# Patient Record
Sex: MALE | Race: WHITE | NOT HISPANIC OR LATINO | Employment: FULL TIME | URBAN - METROPOLITAN AREA
[De-identification: names, ages, dates, MRNs, and addresses within clinical notes are randomized per-mention and may not be internally consistent; named-entity substitution may affect disease eponyms.]

---

## 2017-11-02 ENCOUNTER — HOSPITAL ENCOUNTER (EMERGENCY)
Facility: HOSPITAL | Age: 47
Discharge: HOME | End: 2017-11-02
Attending: EMERGENCY MEDICINE | Admitting: EMERGENCY MEDICINE
Payer: COMMERCIAL

## 2017-11-02 VITALS
HEART RATE: 97 BPM | SYSTOLIC BLOOD PRESSURE: 142 MMHG | DIASTOLIC BLOOD PRESSURE: 94 MMHG | WEIGHT: 203 LBS | RESPIRATION RATE: 16 BRPM | OXYGEN SATURATION: 98 % | TEMPERATURE: 98 F

## 2017-11-02 DIAGNOSIS — S09.90XA CLOSED HEAD INJURY, INITIAL ENCOUNTER: ICD-10-CM
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
## 2017-11-02 PROCEDURE — 99283 EMERGENCY DEPT VISIT LOW MDM: CPT
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
## 2024-11-07 ENCOUNTER — APPOINTMENT (EMERGENCY)
Dept: CT IMAGING | Facility: HOSPITAL | Age: 54
End: 2024-11-07
Payer: COMMERCIAL

## 2024-11-07 ENCOUNTER — HOSPITAL ENCOUNTER (EMERGENCY)
Facility: HOSPITAL | Age: 54
Discharge: HOME | End: 2024-11-07
Attending: EMERGENCY MEDICINE
Payer: COMMERCIAL

## 2024-11-07 VITALS
HEART RATE: 68 BPM | TEMPERATURE: 97.3 F | OXYGEN SATURATION: 98 % | RESPIRATION RATE: 16 BRPM | SYSTOLIC BLOOD PRESSURE: 166 MMHG | DIASTOLIC BLOOD PRESSURE: 88 MMHG

## 2024-11-07 DIAGNOSIS — M54.50 LOW BACK PAIN: Primary | ICD-10-CM

## 2024-11-07 LAB
ALBUMIN SERPL BCG-MCNC: 4.1 G/DL (ref 3.5–5)
ALP SERPL-CCNC: 56 U/L (ref 34–104)
ALT SERPL W P-5'-P-CCNC: 13 U/L (ref 7–52)
ANION GAP SERPL CALCULATED.3IONS-SCNC: 4 MMOL/L (ref 4–13)
AST SERPL W P-5'-P-CCNC: 23 U/L (ref 13–39)
BASOPHILS # BLD AUTO: 0.03 THOUSANDS/ÂΜL (ref 0–0.1)
BASOPHILS NFR BLD AUTO: 0 % (ref 0–1)
BILIRUB SERPL-MCNC: 0.53 MG/DL (ref 0.2–1)
BILIRUB UR QL STRIP: NEGATIVE
BUN SERPL-MCNC: 12 MG/DL (ref 5–25)
CALCIUM SERPL-MCNC: 8.7 MG/DL (ref 8.4–10.2)
CHLORIDE SERPL-SCNC: 104 MMOL/L (ref 96–108)
CLARITY UR: ABNORMAL
CO2 SERPL-SCNC: 27 MMOL/L (ref 21–32)
COLOR UR: ABNORMAL
CREAT SERPL-MCNC: 0.88 MG/DL (ref 0.6–1.3)
EOSINOPHIL # BLD AUTO: 0.02 THOUSAND/ÂΜL (ref 0–0.61)
EOSINOPHIL NFR BLD AUTO: 0 % (ref 0–6)
ERYTHROCYTE [DISTWIDTH] IN BLOOD BY AUTOMATED COUNT: 12.9 % (ref 11.6–15.1)
GFR SERPL CREATININE-BSD FRML MDRD: 96 ML/MIN/1.73SQ M
GLUCOSE SERPL-MCNC: 100 MG/DL (ref 65–140)
GLUCOSE UR STRIP-MCNC: NEGATIVE MG/DL
HCT VFR BLD AUTO: 43.6 % (ref 36.5–49.3)
HGB BLD-MCNC: 14.6 G/DL (ref 12–17)
HGB UR QL STRIP.AUTO: NEGATIVE
IMM GRANULOCYTES # BLD AUTO: 0.04 THOUSAND/UL (ref 0–0.2)
IMM GRANULOCYTES NFR BLD AUTO: 0 % (ref 0–2)
KETONES UR STRIP-MCNC: ABNORMAL MG/DL
LEUKOCYTE ESTERASE UR QL STRIP: NEGATIVE
LYMPHOCYTES # BLD AUTO: 0.85 THOUSANDS/ÂΜL (ref 0.6–4.47)
LYMPHOCYTES NFR BLD AUTO: 8 % (ref 14–44)
MCH RBC QN AUTO: 30.7 PG (ref 26.8–34.3)
MCHC RBC AUTO-ENTMCNC: 33.5 G/DL (ref 31.4–37.4)
MCV RBC AUTO: 92 FL (ref 82–98)
MONOCYTES # BLD AUTO: 0.61 THOUSAND/ÂΜL (ref 0.17–1.22)
MONOCYTES NFR BLD AUTO: 6 % (ref 4–12)
NEUTROPHILS # BLD AUTO: 9.04 THOUSANDS/ÂΜL (ref 1.85–7.62)
NEUTS SEG NFR BLD AUTO: 86 % (ref 43–75)
NITRITE UR QL STRIP: NEGATIVE
NRBC BLD AUTO-RTO: 0 /100 WBCS
PH UR STRIP.AUTO: 7.5 [PH]
PLATELET # BLD AUTO: 265 THOUSANDS/UL (ref 149–390)
PMV BLD AUTO: 10.5 FL (ref 8.9–12.7)
POTASSIUM SERPL-SCNC: 5.7 MMOL/L (ref 3.5–5.3)
PROT SERPL-MCNC: 6.7 G/DL (ref 6.4–8.4)
PROT UR STRIP-MCNC: NEGATIVE MG/DL
RBC # BLD AUTO: 4.76 MILLION/UL (ref 3.88–5.62)
SODIUM SERPL-SCNC: 135 MMOL/L (ref 135–147)
SP GR UR STRIP.AUTO: 1.02 (ref 1–1.03)
UROBILINOGEN UR STRIP-ACNC: <2 MG/DL
WBC # BLD AUTO: 10.59 THOUSAND/UL (ref 4.31–10.16)

## 2024-11-07 PROCEDURE — 74177 CT ABD & PELVIS W/CONTRAST: CPT

## 2024-11-07 PROCEDURE — 81003 URINALYSIS AUTO W/O SCOPE: CPT | Performed by: PHYSICIAN ASSISTANT

## 2024-11-07 PROCEDURE — 36415 COLL VENOUS BLD VENIPUNCTURE: CPT | Performed by: PHYSICIAN ASSISTANT

## 2024-11-07 PROCEDURE — 85025 COMPLETE CBC W/AUTO DIFF WBC: CPT | Performed by: PHYSICIAN ASSISTANT

## 2024-11-07 PROCEDURE — 96374 THER/PROPH/DIAG INJ IV PUSH: CPT

## 2024-11-07 PROCEDURE — 99285 EMERGENCY DEPT VISIT HI MDM: CPT | Performed by: PHYSICIAN ASSISTANT

## 2024-11-07 PROCEDURE — 96375 TX/PRO/DX INJ NEW DRUG ADDON: CPT

## 2024-11-07 PROCEDURE — 80053 COMPREHEN METABOLIC PANEL: CPT | Performed by: PHYSICIAN ASSISTANT

## 2024-11-07 PROCEDURE — 99284 EMERGENCY DEPT VISIT MOD MDM: CPT

## 2024-11-07 PROCEDURE — 96376 TX/PRO/DX INJ SAME DRUG ADON: CPT

## 2024-11-07 RX ORDER — OXYCODONE AND ACETAMINOPHEN 5; 325 MG/1; MG/1
1 TABLET ORAL EVERY 6 HOURS PRN
Qty: 12 TABLET | Refills: 0 | Status: SHIPPED | OUTPATIENT
Start: 2024-11-07 | End: 2024-11-10

## 2024-11-07 RX ORDER — CYCLOBENZAPRINE HCL 5 MG
5 TABLET ORAL 3 TIMES DAILY PRN
Qty: 9 TABLET | Refills: 0 | Status: SHIPPED | OUTPATIENT
Start: 2024-11-07 | End: 2024-11-10

## 2024-11-07 RX ORDER — ONDANSETRON 2 MG/ML
4 INJECTION INTRAMUSCULAR; INTRAVENOUS ONCE
Status: COMPLETED | OUTPATIENT
Start: 2024-11-07 | End: 2024-11-07

## 2024-11-07 RX ORDER — NAPROXEN 500 MG/1
500 TABLET ORAL 2 TIMES DAILY WITH MEALS
Qty: 14 TABLET | Refills: 0 | Status: SHIPPED | OUTPATIENT
Start: 2024-11-07 | End: 2024-11-14

## 2024-11-07 RX ORDER — HYDROMORPHONE HCL/PF 1 MG/ML
0.5 SYRINGE (ML) INJECTION ONCE
Status: COMPLETED | OUTPATIENT
Start: 2024-11-07 | End: 2024-11-07

## 2024-11-07 RX ADMIN — HYDROMORPHONE HYDROCHLORIDE 0.5 MG: 1 INJECTION, SOLUTION INTRAMUSCULAR; INTRAVENOUS; SUBCUTANEOUS at 20:34

## 2024-11-07 RX ADMIN — IOHEXOL 100 ML: 350 INJECTION, SOLUTION INTRAVENOUS at 19:29

## 2024-11-07 RX ADMIN — HYDROMORPHONE HYDROCHLORIDE 0.5 MG: 1 INJECTION, SOLUTION INTRAMUSCULAR; INTRAVENOUS; SUBCUTANEOUS at 17:55

## 2024-11-07 RX ADMIN — ONDANSETRON 4 MG: 2 INJECTION INTRAMUSCULAR; INTRAVENOUS at 17:56

## 2024-11-07 NOTE — ED PROVIDER NOTES
Time reflects when diagnosis was documented in both MDM as applicable and the Disposition within this note       Time User Action Codes Description Comment    11/7/2024  8:24 PM Dennise Orellana Add [M54.50] Low back pain           ED Disposition       ED Disposition   Discharge    Condition   Stable    Date/Time   Thu Nov 7, 2024  8:24 PM    Comment   Mikie Garcia discharge to home/self care.                   Assessment & Plan       Medical Decision Making  Differential diagnosis includes but is not limited to: Kidney stone, appendicitis, musculoskeletal, UTI, will check PSA as patient has concern for hereditary prostate cancer because his father has it.  He states he has never been checked.    Amount and/or Complexity of Data Reviewed  Independent Historian: spouse  Labs: ordered. Decision-making details documented in ED Course.  Radiology: ordered.    Risk  Prescription drug management.        ED Course as of 11/07/24 2031   Thu Nov 07, 2024   1837 Potassium(!): 5.7  Moderately hemolyzed   1945 Reviewed lab results with patient, his pain is improved, awaiting CT results       Medications   HYDROmorphone (DILAUDID) injection 0.5 mg (has no administration in time range)   ondansetron (ZOFRAN) injection 4 mg (4 mg Intravenous Given 11/7/24 1756)   HYDROmorphone (DILAUDID) injection 0.5 mg (0.5 mg Intravenous Given 11/7/24 1755)   iohexol (OMNIPAQUE) 350 MG/ML injection (MULTI-DOSE) 100 mL (100 mL Intravenous Given 11/7/24 1929)       ED Risk Strat Scores                           SBIRT 20yo+      Flowsheet Row Most Recent Value   Initial Alcohol Screen: US AUDIT-C     1. How often do you have a drink containing alcohol? 0 Filed at: 11/07/2024 1632   2. How many drinks containing alcohol do you have on a typical day you are drinking?  0 Filed at: 11/07/2024 1632   3a. Male UNDER 65: How often do you have five or more drinks on one occasion? 0 Filed at: 11/07/2024 1632   3b. FEMALE Any Age, or MALE 65+: How often do  "you have 4 or more drinks on one occassion? 0 Filed at: 11/07/2024 1632   Audit-C Score 0 Filed at: 11/07/2024 1632   NOHEMY: How many times in the past year have you...    Used an illegal drug or used a prescription medication for non-medical reasons? Never Filed at: 11/07/2024 1632                            History of Present Illness       Chief Complaint   Patient presents with    Back Pain     Hx of back pain. Pointing at right sciatic region. Noted intense \"bone on bone feeling\" in right groin/hip area. Reports difficulty ambulating, sitting.        History reviewed. No pertinent past medical history.   History reviewed. No pertinent surgical history.   History reviewed. No pertinent family history.   Social History     Tobacco Use    Smoking status: Never    Smokeless tobacco: Never   Substance Use Topics    Alcohol use: Yes     Comment: daily    Drug use: No      E-Cigarette/Vaping      E-Cigarette/Vaping Substances      I have reviewed and agree with the history as documented.     54-year-old male presents to emergency room for evaluation of right lower back pain.  Patient states he has a history of this however today he came on much different than normal.  He radiates into his groin and he had significant difficulty bending down and lifting his leg up to put his shoe on.  Patient states he was so uncomfortable he almost had to stop the car on the way home from work.  Pain also gives a dull ache into his testicle.  He denies any urinary symptoms.  He states this has happened before with his back pain. Denies fever nausea or vomiting.  Denies chest pain or shortness of breath.  Denies bowel or bladder dysfunction.  Denies saddle anesthesia.  He has tried cupping.  He sees a physiologist and does lots of exercises to of strength training.  He took ibuprofen, Flexeril, Percocet in order to be able to get himself to the ER today.  He is able to walk with a lot of pain.  Denies traumatic injury however does state " he is very active with golfing.      History provided by:  Patient  Back Pain  Associated symptoms: no chest pain, no fever, no numbness and no weakness        Review of Systems   Constitutional:  Negative for fever.   Respiratory:  Negative for shortness of breath.    Cardiovascular:  Negative for chest pain.   Gastrointestinal:  Negative for vomiting.   Genitourinary:  Negative for difficulty urinating.   Musculoskeletal:  Positive for back pain.   Skin:  Negative for rash.   Neurological:  Negative for weakness and numbness.           Objective       ED Triage Vitals   Temperature Pulse Blood Pressure Respirations SpO2 Patient Position - Orthostatic VS   11/07/24 1634 11/07/24 1630 11/07/24 1630 11/07/24 1630 11/07/24 1630 --   (!) 97.3 °F (36.3 °C) 60 (!) 172/96 18 98 %       Temp Source Heart Rate Source BP Location FiO2 (%) Pain Score    11/07/24 1630 11/07/24 1630 11/07/24 1630 -- --    Oral Monitor Right arm        Vitals      Date and Time Temp Pulse SpO2 Resp BP Pain Score FACES Pain Rating User   11/07/24 1634 97.3 °F (36.3 °C) -- -- -- -- -- -- LR   11/07/24 1630 -- 60 98 % 18 172/96 Standing -- -- NS            Physical Exam  Vitals and nursing note reviewed.   Constitutional:       Appearance: Normal appearance. He is well-developed.   HENT:      Head: Atraumatic.      Right Ear: External ear normal.      Left Ear: External ear normal.   Eyes:      General: No scleral icterus.     Conjunctiva/sclera: Conjunctivae normal.   Cardiovascular:      Rate and Rhythm: Normal rate and regular rhythm.      Heart sounds: Normal heart sounds.   Pulmonary:      Effort: Pulmonary effort is normal.      Breath sounds: Normal breath sounds.   Abdominal:      General: Bowel sounds are normal. There is no distension.      Palpations: Abdomen is soft.      Tenderness: There is abdominal tenderness. There is rebound. There is no right CVA tenderness, left CVA tenderness or guarding. Negative signs include Claire's sign  and obturator sign.       Musculoskeletal:         General: Normal range of motion.      Cervical back: Neck supple.      Thoracic back: Normal.      Lumbar back: Tenderness present. No bony tenderness. Negative right straight leg raise test and negative left straight leg raise test.      Comments: 5/5 Strength BLE flexion and extension  No foot drop.  Able to walk     Skin:     General: Skin is warm and dry.      Findings: No rash.      Comments: Signs of cupping noted along right lower back and right upper leg   Neurological:      Mental Status: He is alert and oriented to person, place, and time.      Deep Tendon Reflexes:      Reflex Scores:       Patellar reflexes are 2+ on the right side and 2+ on the left side.  Psychiatric:         Mood and Affect: Mood normal.         Results Reviewed       Procedure Component Value Units Date/Time    UA w Reflex to Microscopic w Reflex to Culture [[id number]]  (Abnormal) Collected: 11/07/24 1847    Lab Status: Final result Specimen: Urine, Clean Catch Updated: 11/07/24 1858     Color, UA Light Yellow     Clarity, UA Turbid     Specific Gravity, UA 1.023     pH, UA 7.5     Leukocytes, UA Negative     Nitrite, UA Negative     Protein, UA Negative mg/dl      Glucose, UA Negative mg/dl      Ketones, UA 10 (1+) mg/dl      Urobilinogen, UA <2.0 mg/dl      Bilirubin, UA Negative     Occult Blood, UA Negative    Comprehensive metabolic panel [[id number]]  (Abnormal) Collected: 11/07/24 1749    Lab Status: Final result Specimen: Blood from Arm, Right Updated: 11/07/24 1833     Sodium 135 mmol/L      Potassium 5.7 mmol/L      Chloride 104 mmol/L      CO2 27 mmol/L      ANION GAP 4 mmol/L      BUN 12 mg/dL      Creatinine 0.88 mg/dL      Glucose 100 mg/dL      Calcium 8.7 mg/dL      AST 23 U/L      ALT 13 U/L      Alkaline Phosphatase 56 U/L      Total Protein 6.7 g/dL      Albumin 4.1 g/dL      Total Bilirubin 0.53 mg/dL      eGFR 96 ml/min/1.73sq m     Narrative:      National Kidney  Disease Foundation guidelines for Chronic Kidney Disease (CKD):     Stage 1 with normal or high GFR (GFR > 90 mL/min/1.73 square meters)    Stage 2 Mild CKD (GFR = 60-89 mL/min/1.73 square meters)    Stage 3A Moderate CKD (GFR = 45-59 mL/min/1.73 square meters)    Stage 3B Moderate CKD (GFR = 30-44 mL/min/1.73 square meters)    Stage 4 Severe CKD (GFR = 15-29 mL/min/1.73 square meters)    Stage 5 End Stage CKD (GFR <15 mL/min/1.73 square meters)  Note: GFR calculation is accurate only with a steady state creatinine    CBC and differential [[id number]]  (Abnormal) Collected: 11/07/24 1749    Lab Status: Final result Specimen: Blood from Arm, Right Updated: 11/07/24 1801     WBC 10.59 Thousand/uL      RBC 4.76 Million/uL      Hemoglobin 14.6 g/dL      Hematocrit 43.6 %      MCV 92 fL      MCH 30.7 pg      MCHC 33.5 g/dL      RDW 12.9 %      MPV 10.5 fL      Platelets 265 Thousands/uL      nRBC 0 /100 WBCs      Segmented % 86 %      Immature Grans % 0 %      Lymphocytes % 8 %      Monocytes % 6 %      Eosinophils Relative 0 %      Basophils Relative 0 %      Absolute Neutrophils 9.04 Thousands/µL      Absolute Immature Grans 0.04 Thousand/uL      Absolute Lymphocytes 0.85 Thousands/µL      Absolute Monocytes 0.61 Thousand/µL      Eosinophils Absolute 0.02 Thousand/µL      Basophils Absolute 0.03 Thousands/µL     PSA, total and free [[id number]] Collected: 11/07/24 1749    Lab Status: In process Specimen: Blood from Arm, Right Updated: 11/07/24 1756            CT abdomen pelvis with contrast   Final Interpretation by Darrion Doll MD (11/07 2010)      Normal caliber appendix. No evidence for obstructive uropathy.         Workstation performed: [id number]         CT recon only lumbar spine   Final Interpretation by Darrion Doll MD (11/07 2014)      Degenerative changes of the lumbar spine, as described above.      Mild to moderate right foraminal narrowing is noted at L3-L4 which should be correlated with any focal radiculopathy  at this level. If warranted MRI could be obtained for more optimal evaluation.            Workstation performed: [id number]             Procedures    ED Medication and Procedure Management   None     Patient's Medications   Discharge Prescriptions    CYCLOBENZAPRINE (FLEXERIL) 5 MG TABLET    Take 1 tablet (5 mg total) by mouth 3 (three) times a day as needed for muscle spasms for up to 3 days       Start Date: 11/7/2024 End Date: 11/10/2024       Order Dose: 5 mg       Quantity: 9 tablet    Refills: 0    NAPROXEN (EC NAPROSYN) 500 MG EC TABLET    Take 1 tablet (500 mg total) by mouth 2 (two) times a day with meals for 7 days       Start Date: 11/7/2024 End Date: 11/14/2024       Order Dose: 500 mg       Quantity: 14 tablet    Refills: 0    OXYCODONE-ACETAMINOPHEN (PERCOCET) 5-325 MG PER TABLET    Take 1 tablet by mouth every 6 (six) hours as needed for moderate pain for up to 3 days Max Daily Amount: 4 tablets       Start Date: 11/7/2024 End Date: 11/10/2024       Order Dose: 1 tablet       Quantity: 12 tablet    Refills: 0       ED SEPSIS DOCUMENTATION   Time reflects when diagnosis was documented in both MDM as applicable and the Disposition within this note       Time User Action Codes Description Comment    11/7/2024  8:24 PM Dennise Orellana [M54.50] Low back pain                  Dennise Orellana PA-C  11/07/24 2031

## 2024-11-07 NOTE — ED PROCEDURE NOTE
Procedure  POC Biliary US    Date/Time: 11/7/2024 5:49 PM    Performed by: Juancarlos Hawthorne MD MPH  Authorized by: Juancarlos Hawthorne MD MPH    Patient location:  ED  Performed by:  Resident  Procedure details:     Exam Type:  Educational    Indications: back pain      Assessment for:  Cholelithiasis    Views obtained: gallbladder (transverse and longitudinal) and liver      Image quality: limited diagnostic      Image availability:  Images available in PACS  Findings:     Cholelithiasis: not identified      Gallbladder wall:  Normal    Pericholecystic fluid: not identified      Sonographic Claire's sign: negative      Polyps: not identified      Mass: not identified    Interpretation:     Biliary ultrasound impressions: normal gallbladder ultrasound    POC Renal US    Date/Time: 11/7/2024 5:50 PM    Performed by: Juancarlos Hawthorne MD MPH  Authorized by: Juancarlos Hawthorne MD MPH    Patient location:  ED  Performed by:  Resident  Procedure details:     Exam Type:  Educational    Indications: flank/back pain      Views obtained: left kidney and right kidney      Image quality: limited diagnostic      Image availability:  Images available in PACS  Findings:     LEFT kidney findings: unremarkable      LEFT hydronephrosis: none      RIGHT kidney findings: unremarkable      RIGHT hydronephrosis: none    Interpretation:     Renal ultrasound impressions: normal exam    POC AAA US    Date/Time: 11/7/2024 5:50 PM    Performed by: Juancarlos Hawthorne MD MPH  Authorized by: Juancarlos Hawthorne MD MPH    Patient location:  ED  Performing Provider:  Resident  Procedure details:     Exam Type:  Educational    Indications: back pain      Views Obtained:  Transverse mid view    Image quality: limited diagnostic      Image availability:  Images available in PACS  Findings:     Abdominal Aorta Findings: normal      Intra-abdominal fluid: not identified    Interpretation:     Aortic ultrasound impression: indeterminate                      Juancarlos Hawthorne MD MPH  11/07/24 1736

## 2024-11-07 NOTE — Clinical Note
Mikie Garcia was seen and treated in our emergency department on 11/7/2024.    No restrictions    No sports until cleared by Family Doctor/Orthopedics        Diagnosis:     Mikie  may return to work on return date.    He may return on this date: 11/11/2024         If you have any questions or concerns, please don't hesitate to call.      Dennise Orellana PA-C    ______________________________           _______________          _______________  Hospital Representative                              Date                                Time

## 2024-11-08 ENCOUNTER — TELEPHONE (OUTPATIENT)
Dept: PHYSICAL THERAPY | Facility: OTHER | Age: 54
End: 2024-11-08

## 2024-11-08 NOTE — TELEPHONE ENCOUNTER
Message left for patient regarding the referral entered for  [facility].   Contact information, hours of operation and VM instructions provided.  Requested patient to CB to discuss CSP services.    This is the first attempt to reach the patient.  Referral deferred for f/u attempt per protocol.

## 2024-11-11 ENCOUNTER — NURSE TRIAGE (OUTPATIENT)
Dept: PHYSICAL THERAPY | Facility: OTHER | Age: 54
End: 2024-11-11

## 2024-11-11 DIAGNOSIS — M54.50 ACUTE RIGHT-SIDED LOW BACK PAIN, UNSPECIFIED WHETHER SCIATICA PRESENT: Primary | ICD-10-CM

## 2024-11-11 NOTE — TELEPHONE ENCOUNTER
Additional Information   Negative: Has the patient had unexplained weight loss?   Negative: Does the patient have a fever?   Negative: Is the patient experiencing urine retention?   Negative: Is the patient experiencing acute drop foot or paralysis?   Negative: Has the patient experienced major trauma? (fall from height, high speed collision, direct blow to spine) and is also experiencing nausea, light-headedness, or loss of consciousness?   Negative: Is the patient experiencing blood in sputum?   Negative: Is this a chronic condition?    Protocols used: [facility] Protocol    Nurse completed triage and NO RF s/s present. Referral entered for the [place] site and contact/phone number info given to the patient as well.   Patients information was sent to the preferred site and pt made aware clerical would be calling to schedule the evaluation appointment. Nurse encouraged him to call the site if he does not hear from clerical beforehand. Patient Agreed.    Patient did not voice any additional questions or concerns at this time.   Patient is aware current/past complaints, relevant dx, additional referrals and treatment/options will be discussed at the evaluation/consultation appointment.   Patient is in agreement with plan to be evaluated by [facility] therapist/DPT.   Patient very appreciative of CB and referral placement.     Nurse wished him well and referral updated & closed.

## 2024-11-11 NOTE — TELEPHONE ENCOUNTER
This nurse called the patient to proceed with the triage initiated earlier today.   Message left stating reason for call, [facility] contact information, and hours of operation.    Encouraged patient to CB if he would like to complete the triage and referral process.    Referral closed per protocol and will await possible call-back from the patient.     Patient returned CS call. Please see prior note(s).

## 2024-11-11 NOTE — TELEPHONE ENCOUNTER
"Patient called into [facility] today 11/11 and left v/m @7:55am.    Returned patient's call @8:09am.      Additional Information   Negative: Is this related to a work injury?   Negative: Is this related to an MVA?   Negative: Are you currently recieving homecare services?    Background - Initial Assessment  Clinical complaint: ED visit on 11/07 due to Right Sided Lower Back Pain. Hx of back pain on and off for 20 years. This new flare up started on thursday 11/07, it radiates to the right side of his spine, down into the hip, sacrum, groin area and down to the lower leg (calf). Numbness and tingling from the right hip down to the knee since Friday. Not seeing a spine Dr for this pain at the moment. Patient states pain is constant and at times intermittent, worse with certain movements and when walking more than 10 minutes. NKI. Patient described pain as tightness, intense, dull, burning \"as is bones are rubbing together\".  Date of onset: Thursday 11/07/24 ( new flare up)  Frequency of pain: constant, at times intermittent.  Quality of pain: burning, dull, numb, intense, tight and tingling.    Protocols used: [facility] Protocol    "

## 2024-11-13 ENCOUNTER — EVALUATION (OUTPATIENT)
Dept: PHYSICAL THERAPY | Facility: REHABILITATION | Age: 54
End: 2024-11-13
Payer: COMMERCIAL

## 2024-11-13 DIAGNOSIS — M54.50 ACUTE RIGHT-SIDED LOW BACK PAIN, UNSPECIFIED WHETHER SCIATICA PRESENT: ICD-10-CM

## 2024-11-13 PROCEDURE — 97112 NEUROMUSCULAR REEDUCATION: CPT | Performed by: PHYSICAL THERAPIST

## 2024-11-13 PROCEDURE — 97140 MANUAL THERAPY 1/> REGIONS: CPT | Performed by: PHYSICAL THERAPIST

## 2024-11-13 PROCEDURE — 97162 PT EVAL MOD COMPLEX 30 MIN: CPT | Performed by: PHYSICAL THERAPIST

## 2024-11-13 NOTE — PROGRESS NOTES
PT Evaluation     Today's date: 2024  Patient name: Mikie Garcia  : 1970  MRN: [id number]  Referring provider: Jim Garcia PT  Dx:   Encounter Diagnosis     ICD-10-CM    1. Acute right-sided low back pain, unspecified whether sciatica present  M54.50 Ambulatory referral to PT spine                     Assessment  Impairments: abnormal coordination, abnormal gait, abnormal muscle firing, abnormal muscle tone, abnormal or restricted ROM, abnormal movement, activity intolerance, impaired balance, impaired physical strength, lacks appropriate home exercise program, pain with function, poor posture , poor body mechanics, participation limitations and endurance    Assessment details: Mikie Garcia is a pleasant 54 y.o. male who presents with acute low back pain. The patient reports it started bothering him last week, and may be due to golf, as he plays twice a week. Symptom location is right side of the low back and radiates to the right thigh and front of the right hip. No red flags upon evaluation.     Current signs and symptoms consistent with an acute lumbar radiculopathy, with primary movement impairments including poor right LE neurodynamics, lumbar spine mobility deficit primarily into flexion and right side glide, as well as poor lumbopelvic movement coordination. The patient did have most symptom reproduction today with PAIVM assessment unilaterally on right side of lumbar spine, palpation to illiopsoas near insertion by lesser trochanter, and active lumbar movements, especially flexion and right sideglide. At this time, the patient would benefit from skilled PT to address his current deficits, improve his quality of life, and restore his PLOF. No further referral is necessary at this time based upon examination results.     Understanding of Dx/Px/POC: good     Prognosis: good    Goals  Impairment Based Goals:  1. Decreased pain by 50% in 4 weeks.  2. Improve ROM to WFL by discharge.    3. Improve strength by 1/2 measure in 6 weeks.   4. Improve FOTO greater than predicted outcome score by discharge.      Functional Based Goals: To be met upon discharge  1. Patient will be able to return to playing golf.   2. Patient will be fully independent with HEP by discharge  3. Patient will be able to manage symptoms independently.         Plan  Patient would benefit from: skilled physical therapy  Referral necessary: No    Planned therapy interventions: abdominal trunk stabilization, activity modification, balance, balance/weight bearing training, body mechanics training, breathing training, compression, coordination, flexibility, functional ROM exercises, gait training, graded exercise, graded activity, graded motor, home exercise program, therapeutic exercise, therapeutic activities, stretching, strengthening, self care, postural training, patient/caregiver education, neuromuscular re-education, nerve gliding, motor coordination training, muscle pump exercises, massage, manual therapy, kinesiology taping, joint mobilization and IASTM    Plan of Care beginning date: 11/13/2024  Plan of Care expiration date: 1/22/2025  Treatment plan discussed with: patient        Subjective Evaluation    History of Present Illness  Mechanism of injury: Beginning in the middle of COVID I started doing HIIT personal training 2x/week. I used to be a big runner, and was running multiple half marathons per year. I was mostly working on bodyweight training. Also doing golf 2x week. My pain is mostly on the right side of my low back. This past Thursday I was having a lot of trouble walking. When I got into the car, I started having numbness and burning pain in my right thigh. I did go to the emergency room. There was nothing significant found. The symptoms have improved since last Thursday.           Recurrent probem    Patient Goals  Patient goals for therapy: decreased pain, improved balance, increased motion, return to  sport/leisure activities, independence with ADLs/IADLs and increased strength  Patient goal: return to playing golf  Pain  Current pain ratin  At best pain ratin  At worst pain ratin  Location: located on the right side of the lower back and it will travel to my right hip, groin, and right thigh  Quality: radiating, tight and discomfort  Alleviating factors: laying down and resting typically helps the right leg pain. Also generally the more I move the better I feel.  Aggravating factors: sitting    Treatments  Previous treatment: medication  Current treatment: physical therapy        Objective     Concurrent Complaints  Positive for night pain and disturbed sleep. Negative for bladder dysfunction, bowel dysfunction, saddle (S4) numbness (I sometimes get a dull sensation of right testicle pain, but not numbness), cardiac problem, kidney problem, history of cancer, history of trauma and infection    General Comments:      Lumbar Comments  Myotomes  Strength WNL bilaterally.    Dermatomes  Sensation intact bilaterally.    Reflexes  R Patellar Reflex: (1+)  L Patellar Reflex: (1+)  R Achilles Reflex: (1+)  L Achilles Reflex: (1+)    Neurodynamic Testing  Femoral Nerve Traction Test: +R    Lumbar Active Range of Motion  Movement Loss Symptoms Marcio Mod Min Nil Symptoms  Flexion      X  Right-sided low back pain     Extension    X   Right-sided low back pain      R SG      X  Right-sided low back pain      L SG      X  Right-sided low back pain      Other           Hip Special Tests:  FADIRS= (-), FABERS= (+R), Scours= (-)     Palpation:   (+) R Lumbar with PAIVMs especially L2-L5   (+) R iliopsoas                    POC expires Auth Expiration date PT/OT + Visit Limit?   2025   BOMN        Visit/Unit Tracking  AUTH Status:  Date         [employer] Used 1         Remaining                 Precautions:       Manuals             R Lumbar UPAs SE                                                    Neuro Re-Ed             Patient Ed 15'            Pallof             Bridge             GHD             Sideplank             Plank             Hip Hinge                                                    Ther Ex             Warm Up                                                                                                        Ther Activity                                       Gait Training                                       Modalities

## 2024-11-13 NOTE — LETTER
2024    Nirav Villareal DO  [address] [address]    Patient: Mikie Garcia  YOB: 1970   Date of Visit: 2024      Dear Dr. Villareal:    One of your patients, Mikie Garcia, was referred to me by the [facility].  Please review the attached evaluation summary from Mikie Garcia's recent visit.  Please verify that you agree with the plan of care by signing the attached document and sending it back to our office.   If you have any questions or concerns, please don't hesitate to call.      Sincerely,    Brian Henderson, PT      Primary Care Provider:      I certify that I have read the below Plan of Care and certify the need for these services furnished under this plan of treatment while under my care.                                  Nirav Villareal DO  [address] [address]  Via Fax: [phone]           PT Evaluation     Today's date: 2024  Patient name: Mikie Garcia  : 1970  MRN: [id number]  Referring provider: Jim Garcia PT  Dx:   Encounter Diagnosis     ICD-10-CM    1. Acute right-sided low back pain, unspecified whether sciatica present  M54.50 Ambulatory referral to PT spine                     Assessment  Impairments: abnormal coordination, abnormal gait, abnormal muscle firing, abnormal muscle tone, abnormal or restricted ROM, abnormal movement, activity intolerance, impaired balance, impaired physical strength, lacks appropriate home exercise program, pain with function, poor posture , poor body mechanics, participation limitations and endurance    Assessment details: Mikie Garcia is a pleasant 54 y.o. male who presents with acute low back pain. The patient reports it started bothering him last week, and may be due to golf, as he plays twice a week. Symptom location is right side of the low back and radiates to the right thigh and front of the right hip. No red  flags upon evaluation.     Current signs and symptoms consistent with an acute lumbar radiculopathy, with primary movement impairments including poor right LE neurodynamics, lumbar spine mobility deficit primarily into flexion and right side glide, as well as poor lumbopelvic movement coordination. The patient did have most symptom reproduction today with PAIVM assessment unilaterally on right side of lumbar spine, palpation to illiopsoas near insertion by lesser trochanter, and active lumbar movements, especially flexion and right sideglide. At this time, the patient would benefit from skilled PT to address his current deficits, improve his quality of life, and restore his PLOF. No further referral is necessary at this time based upon examination results.     Understanding of Dx/Px/POC: good     Prognosis: good    Goals  Impairment Based Goals:  1. Decreased pain by 50% in 4 weeks.  2. Improve ROM to WFL by discharge.   3. Improve strength by 1/2 measure in 6 weeks.   4. Improve FOTO greater than predicted outcome score by discharge.      Functional Based Goals: To be met upon discharge  1. Patient will be able to return to playing golf.   2. Patient will be fully independent with HEP by discharge  3. Patient will be able to manage symptoms independently.         Plan  Patient would benefit from: skilled physical therapy  Referral necessary: No    Planned therapy interventions: abdominal trunk stabilization, activity modification, balance, balance/weight bearing training, body mechanics training, breathing training, compression, coordination, flexibility, functional ROM exercises, gait training, graded exercise, graded activity, graded motor, home exercise program, therapeutic exercise, therapeutic activities, stretching, strengthening, self care, postural training, patient/caregiver education, neuromuscular re-education, nerve gliding, motor coordination training, muscle pump exercises, massage, manual therapy,  kinesiology taping, joint mobilization and IASTM    Plan of Care beginning date: 2024  Plan of Care expiration date: 2025  Treatment plan discussed with: patient        Subjective Evaluation    History of Present Illness  Mechanism of injury: Beginning in the middle of COVID I started doing HIIT personal training 2x/week. I used to be a big runner, and was running multiple half marathons per year. I was mostly working on bodyweight training. Also doing golf 2x week. My pain is mostly on the right side of my low back. This past Thursday I was having a lot of trouble walking. When I got into the car, I started having numbness and burning pain in my right thigh. I did go to the emergency room. There was nothing significant found. The symptoms have improved since last Thursday.           Recurrent probem    Patient Goals  Patient goals for therapy: decreased pain, improved balance, increased motion, return to sport/leisure activities, independence with ADLs/IADLs and increased strength  Patient goal: return to playing golf  Pain  Current pain ratin  At best pain ratin  At worst pain ratin  Location: located on the right side of the lower back and it will travel to my right hip, groin, and right thigh  Quality: radiating, tight and discomfort  Alleviating factors: laying down and resting typically helps the right leg pain. Also generally the more I move the better I feel.  Aggravating factors: sitting    Treatments  Previous treatment: medication  Current treatment: physical therapy        Objective     Concurrent Complaints  Positive for night pain and disturbed sleep. Negative for bladder dysfunction, bowel dysfunction, saddle (S4) numbness (I sometimes get a dull sensation of right testicle pain, but not numbness), cardiac problem, kidney problem, history of cancer, history of trauma and infection    General Comments:      Lumbar Comments  Myotomes  Strength WNL  bilaterally.    Dermatomes  Sensation intact bilaterally.    Reflexes  R Patellar Reflex: (1+)  L Patellar Reflex: (1+)  R Achilles Reflex: (1+)  L Achilles Reflex: (1+)    Neurodynamic Testing  Femoral Nerve Traction Test: +R    Lumbar Active Range of Motion  Movement Loss Symptoms Marcio Mod Min Nil Symptoms  Flexion      X  Right-sided low back pain     Extension    X   Right-sided low back pain      R SG      X  Right-sided low back pain      L SG      X  Right-sided low back pain      Other           Hip Special Tests:  FADIRS= (-), FABERS= (+R), Scours= (-)     Palpation:   (+) R Lumbar with PAIVMs especially L2-L5   (+) R iliopsoas                    POC expires Auth Expiration date PT/OT + Visit Limit?   1/22/2025   BOMN        Visit/Unit Tracking  AUTH Status:  Date 11/13        [employer] Used 1         Remaining                 Precautions:       Manuals 11/13            R Lumbar UPAs SE                                                   Neuro Re-Ed             Patient Ed 15'            Pallof             Bridge             GHD             Sideplank             Plank             Hip Hinge                                                    Ther Ex             Warm Up                                                                                                        Ther Activity                                       Gait Training                                       Modalities

## 2024-11-19 ENCOUNTER — OFFICE VISIT (OUTPATIENT)
Dept: PHYSICAL THERAPY | Facility: REHABILITATION | Age: 54
End: 2024-11-19
Payer: COMMERCIAL

## 2024-11-19 DIAGNOSIS — M54.50 ACUTE RIGHT-SIDED LOW BACK PAIN, UNSPECIFIED WHETHER SCIATICA PRESENT: Primary | ICD-10-CM

## 2024-11-19 PROCEDURE — 97140 MANUAL THERAPY 1/> REGIONS: CPT | Performed by: PHYSICAL THERAPIST

## 2024-11-19 PROCEDURE — 97110 THERAPEUTIC EXERCISES: CPT | Performed by: PHYSICAL THERAPIST

## 2024-11-19 PROCEDURE — 97112 NEUROMUSCULAR REEDUCATION: CPT | Performed by: PHYSICAL THERAPIST

## 2024-11-19 NOTE — PROGRESS NOTES
Daily Note     Today's date: 2024  Patient name: Mikie Garcia  : 1970  MRN: [id number]  Referring provider: Jim Garcia, PT  Dx:   Encounter Diagnosis     ICD-10-CM    1. Acute right-sided low back pain, unspecified whether sciatica present  M54.50                      Subjective: Overall I do feel that I have made some improvements. I do still feel some pain in the right hip and groin area.       Objective: See treatment diary below      Assessment: Tolerated treatment well. The patient does continue to have right-sided low back pain with radiating symptoms to the right groin and thigh. He did have symptom reproduction with right sided UPAs of the lumbar spine and also had reported alleviated symptoms post manuals. Introduced core stability interventions today which the patient responded well to. Patient would benefit from continued PT      Plan: Continue per plan of care.        POC expires Auth Expiration date PT/OT + Visit Limit?   2025   BOMN        Visit/Unit Tracking  AUTH Status:  Date        [employer] Used 1 2        Remaining                 Precautions:       Manuals            R Lumbar UPAs SE SE                                                  Neuro Re-Ed             Patient Ed 15' 15'           Pallof             Bridge with adduction   2x10            GHD             Sideplank             Plank             Hip Hinge                                                    Ther Ex             Warm Up             Pallof Prss with Circles  2x10 B 10#           ASLR Supine with Core Brace  3x8                                                                            Ther Activity                                       Gait Training                                       Modalities

## 2024-11-22 ENCOUNTER — OFFICE VISIT (OUTPATIENT)
Dept: PHYSICAL THERAPY | Facility: REHABILITATION | Age: 54
End: 2024-11-22
Payer: COMMERCIAL

## 2024-11-22 DIAGNOSIS — M54.50 ACUTE RIGHT-SIDED LOW BACK PAIN, UNSPECIFIED WHETHER SCIATICA PRESENT: Primary | ICD-10-CM

## 2024-11-22 PROCEDURE — 97110 THERAPEUTIC EXERCISES: CPT | Performed by: PHYSICAL THERAPIST

## 2024-11-22 PROCEDURE — 97140 MANUAL THERAPY 1/> REGIONS: CPT | Performed by: PHYSICAL THERAPIST

## 2024-11-22 PROCEDURE — 97112 NEUROMUSCULAR REEDUCATION: CPT | Performed by: PHYSICAL THERAPIST

## 2024-11-22 NOTE — PROGRESS NOTES
"Daily Note     Today's date: 2024  Patient name: Mikie Garcia  : 1970  MRN: [id number]  Referring provider: Jim Garcia, SHIMON  Dx:   Encounter Diagnosis     ICD-10-CM    1. Acute right-sided low back pain, unspecified whether sciatica present  M54.50                      Subjective: I feel that I am continuing to make some progress, but continuing to have similar pain in the right thigh region, glute, and in my low back. The symptoms tend to be variable in regards to where they are and how frequent I am feeling the symptoms.       Objective: See treatment diary below      Assessment: Tolerated treatment well. Responded well to hip mobilizations well today followed by exericse. Patient would benefit from continued PT.      Plan: Continue per plan of care.        POC expires Auth Expiration date PT/OT + Visit Limit?   2025   BOMN        Visit/Unit Tracking  AUTH Status:  Date       [employer] Used 1 2 3       Remaining                 Precautions:       Manuals           R Lumbar UPAs SE SE           R Hip MWM   SE Inf with F/ER/IR Gr IV                                    Neuro Re-Ed             Patient Ed 15' 15'           Pallof             Bridge with adduction   2x10  2x10          GHD             Sideplank             Plank             Hip Hinge             SL [employer] Gym    2x10          Modified Side Plank    10x5\" holds                       Ther Ex             Warm Up             Pallof Prss with Circles  2x10 B 10# 2x10 B 10#          ASLR Supine with Core Brace  3x8 3x8           X Walks                                                                 Ther Activity                                       Gait Training                                       Modalities                                              "

## 2024-11-25 ENCOUNTER — OFFICE VISIT (OUTPATIENT)
Dept: PHYSICAL THERAPY | Facility: REHABILITATION | Age: 54
End: 2024-11-25
Payer: COMMERCIAL

## 2024-11-25 DIAGNOSIS — M54.50 ACUTE RIGHT-SIDED LOW BACK PAIN, UNSPECIFIED WHETHER SCIATICA PRESENT: Primary | ICD-10-CM

## 2024-11-25 PROCEDURE — 97112 NEUROMUSCULAR REEDUCATION: CPT | Performed by: PHYSICAL THERAPIST

## 2024-11-25 PROCEDURE — 97110 THERAPEUTIC EXERCISES: CPT | Performed by: PHYSICAL THERAPIST

## 2024-11-25 PROCEDURE — 97140 MANUAL THERAPY 1/> REGIONS: CPT | Performed by: PHYSICAL THERAPIST

## 2024-11-25 NOTE — PROGRESS NOTES
"Daily Note     Today's date: 2024  Patient name: Mikie Garcia  : 1970  MRN: [id number]  Referring provider: Jim Garcia, PT  Dx:   Encounter Diagnosis     ICD-10-CM    1. Acute right-sided low back pain, unspecified whether sciatica present  M54.50                      Subjective: Overall I feel that I am continuing to improve. Symptoms continue to be located in the right hip area, both in the front and the back. The right side of my back has a stretching feeling when I move in certain directions, but is not the pain it first was.       Objective: See treatment diary below      Assessment: Tolerated treatment well. Significantly challenged with hip external opener in kneeling position with use of kettlebell. Did report improved range of motion after this intervention and felt improved symptoms in right hip region. Patient would benefit from continued PT      Plan: Continue per plan of care.        POC expires Auth Expiration date PT/OT + Visit Limit?   2025   BOMN        Visit/Unit Tracking  AUTH Status:  Date       [employer] Used 1 2 3       Remaining                 Precautions:       Manuals          R Lumbar UPAs SE SE           R Hip MWM   SE Inf with F/ER/IR Gr IV SE                                   Neuro Re-Ed             Patient Ed 15' 15'           Pallof             Bridge with adduction   2x10  2x10 2x10         GHD             Adductor Half Kneel Lunge              Plank             Hip Hinge             SL [employer] Gym    2x10 2x15         Modified Side Plank    10x5\" holds 10x5\" holds                      Ther Ex             Warm Up             Pallof Prss with Circles  2x10 B 10# 2x10 B 10# 2x10 B 10#         ASLR Supine with Core Brace  3x8 3x8  3x8          X Walks             Quadruped Hip Rock (Foot Crossed)    15x (R over L)                                                Ther Activity                                     "   Gait Training                                       Modalities

## 2024-11-27 ENCOUNTER — OFFICE VISIT (OUTPATIENT)
Dept: PHYSICAL THERAPY | Facility: REHABILITATION | Age: 54
End: 2024-11-27
Payer: COMMERCIAL

## 2024-11-27 DIAGNOSIS — M54.50 ACUTE RIGHT-SIDED LOW BACK PAIN, UNSPECIFIED WHETHER SCIATICA PRESENT: Primary | ICD-10-CM

## 2024-11-27 PROCEDURE — 97140 MANUAL THERAPY 1/> REGIONS: CPT | Performed by: PHYSICAL THERAPIST

## 2024-11-27 PROCEDURE — 97112 NEUROMUSCULAR REEDUCATION: CPT | Performed by: PHYSICAL THERAPIST

## 2024-11-27 PROCEDURE — 97110 THERAPEUTIC EXERCISES: CPT | Performed by: PHYSICAL THERAPIST

## 2024-11-27 NOTE — PROGRESS NOTES
"Daily Note     Today's date: 2024  Patient name: Mikie Garcia  : 1970  MRN: [id number]  Referring provider: Jim Garcia, SHIMON  Dx:   Encounter Diagnosis     ICD-10-CM    1. Acute right-sided low back pain, unspecified whether sciatica present  M54.50                      Subjective: Continuing to feel that I am getting better and my movement is becoming better as well.       Objective: See treatment diary below      Assessment: Tolerated treatment well. Patient would benefit from continued PT      Plan: Continue per plan of care.        POC expires Auth Expiration date PT/OT + Visit Limit?   2025   BOMN        Visit/Unit Tracking  AUTH Status:  Date      [employer] Used 1 2 3 4      Remaining                 Precautions:       Manuals         R Lumbar UPAs SE SE           R Hip MWM   SE Inf with F/ER/IR Gr IV SE SE                                  Neuro Re-Ed             Patient Ed 15' 15'           Pallof             Bridge with adduction   2x10  2x10 2x10 3x8 march        GHD             Adductor Half Kneel Lunge              Plank             Hip Hinge             SL Total Gym    2x10 2x15 2x15        Modified Side Plank    10x5\" holds 10x5\" holds 10x5\" holds                     Ther Ex             Warm Up             Pallof Prss with Circles  2x10 B 10# 2x10 B 10# 2x10 B 10# 2x10 B 10#        ASLR Supine with Core Brace  3x8 3x8  3x8  3x8         X Walks     3 laps        Quadruped Hip Rock (Foot Crossed)    15x (R over L) 15x                                               Ther Activity                                       Gait Training                                       Modalities                                                  "

## 2024-12-02 ENCOUNTER — OFFICE VISIT (OUTPATIENT)
Dept: PHYSICAL THERAPY | Facility: REHABILITATION | Age: 54
End: 2024-12-02
Payer: COMMERCIAL

## 2024-12-02 DIAGNOSIS — M54.50 ACUTE RIGHT-SIDED LOW BACK PAIN, UNSPECIFIED WHETHER SCIATICA PRESENT: Primary | ICD-10-CM

## 2024-12-02 PROCEDURE — 97110 THERAPEUTIC EXERCISES: CPT | Performed by: PHYSICAL THERAPIST

## 2024-12-02 PROCEDURE — 97140 MANUAL THERAPY 1/> REGIONS: CPT | Performed by: PHYSICAL THERAPIST

## 2024-12-02 PROCEDURE — 97112 NEUROMUSCULAR REEDUCATION: CPT | Performed by: PHYSICAL THERAPIST

## 2024-12-02 NOTE — PROGRESS NOTES
"Daily Note     Today's date: 2024  Patient name: Mikie Garcia  : 1970  MRN: [id number]  Referring provider: Jim Garcia, PT  Dx:   Encounter Diagnosis     ICD-10-CM    1. Acute right-sided low back pain, unspecified whether sciatica present  M54.50                      Subjective: No major changes in how I have been feeling.       Objective: See treatment diary below      Assessment: Tolerated treatment well. Patient continues to be appropriately challenged at current therapeutic volume and intensity. Patient would benefit from continued PT      Plan: Continue per plan of care.        POC expires Auth Expiration date PT/OT + Visit Limit?   2025   BOMN        Visit/Unit Tracking  AUTH Status:  Date     [employer] Used 1 2 3 4 5     Remaining                 Precautions:       Manuals        R Lumbar UPAs        R Hip MWM SE                       Neuro Re-Ed        Patient Ed        Pallof        Bridge with adduction  3x8 march       GHD        Adductor Half Kneel Lunge         Plank        Hip Hinge        SL Total Gym  2x15       Modified Side Plank  10x5\" holds               Ther Ex        Warm Up        Pallof Prss with Circles 2x10 B 10#       ASLR Supine with Core Brace 3x8        X Walks 3 laps       Quadruped Hip Rock (Foot Crossed) 15x                               Ther Activity                        Gait Training                        Modalities                                     "

## 2024-12-04 ENCOUNTER — OFFICE VISIT (OUTPATIENT)
Dept: PHYSICAL THERAPY | Facility: REHABILITATION | Age: 54
End: 2024-12-04
Payer: COMMERCIAL

## 2024-12-04 DIAGNOSIS — M54.50 ACUTE RIGHT-SIDED LOW BACK PAIN, UNSPECIFIED WHETHER SCIATICA PRESENT: Primary | ICD-10-CM

## 2024-12-04 PROCEDURE — 97112 NEUROMUSCULAR REEDUCATION: CPT | Performed by: PHYSICAL THERAPIST

## 2024-12-04 PROCEDURE — 97140 MANUAL THERAPY 1/> REGIONS: CPT | Performed by: PHYSICAL THERAPIST

## 2024-12-04 PROCEDURE — 97110 THERAPEUTIC EXERCISES: CPT | Performed by: PHYSICAL THERAPIST

## 2024-12-04 NOTE — PROGRESS NOTES
"Daily Note     Today's date: 2024  Patient name: Mikie Garcia  : 1970  MRN: [id number]  Referring provider: Jim Garcia, PT  Dx:   Encounter Diagnosis     ICD-10-CM    1. Acute right-sided low back pain, unspecified whether sciatica present  M54.50                      Subjective: Continuing to notice some progress. The leg was a bit sore after last session and I did clean some leaves at my yard this morning for about an hour and a half.       Objective: See treatment diary below      Assessment: Tolerated treatment well. Patient continues to be appropriately challenged at current therapeutic volume and intensity. Patient would benefit from continued PT      Plan: Continue per plan of care.        POC expires Auth Expiration date PT/OT + Visit Limit?   2025   BOMN        Visit/Unit Tracking  AUTH Status:  Date    [employer] Used 1 2 3 4 5 6    Remaining                 Precautions:       Manuals       R Lumbar UPAs        R Hip MWM SE SE distraction                       Neuro Re-Ed        Patient Ed        Adductor Lunge with Slider  3x8 B       Bridge with adduction  3x8 march SL hip thrust      GHD        Adductor Half Kneel Lunge   2x10 20# KB      Kickstand SL RDL with Landmine   Bar 45# 3x8 B      Hip Hinge        SL Total Gym  2x15 2x15      Modified Side Plank  10x5\" holds Full side plank to fatigue 4x B      Hip Flexor knee to chest raise at wall   3x8 otb B      Ther Ex        Warm Up        Pallof Prss with Circles 2x10 B 10# 2x10 B 10# with sidesteps      ASLR Supine with Core Brace 3x8  3x8 3#      X Walks 3 laps 3 laps      Quadruped Hip Rock (Foot Crossed) 15x HEP                              Ther Activity                        Gait Training                        Modalities                                       "

## 2024-12-09 ENCOUNTER — OFFICE VISIT (OUTPATIENT)
Dept: PHYSICAL THERAPY | Facility: REHABILITATION | Age: 54
End: 2024-12-09
Payer: COMMERCIAL

## 2024-12-09 DIAGNOSIS — M54.50 ACUTE RIGHT-SIDED LOW BACK PAIN, UNSPECIFIED WHETHER SCIATICA PRESENT: Primary | ICD-10-CM

## 2024-12-09 PROCEDURE — 97110 THERAPEUTIC EXERCISES: CPT | Performed by: PHYSICAL THERAPIST

## 2024-12-09 PROCEDURE — 97140 MANUAL THERAPY 1/> REGIONS: CPT | Performed by: PHYSICAL THERAPIST

## 2024-12-09 PROCEDURE — 97112 NEUROMUSCULAR REEDUCATION: CPT | Performed by: PHYSICAL THERAPIST

## 2024-12-09 NOTE — PROGRESS NOTES
"Daily Note     Today's date: 2024  Patient name: Mikie Garcia  : 1970  MRN: [id number]  Referring provider: Jim Garcia, PT  Dx:   Encounter Diagnosis     ICD-10-CM    1. Acute right-sided low back pain, unspecified whether sciatica present  M54.50                      Subjective: I was much more active over this past weekend and it felt good. More of my symptoms are on the inside of the right leg.       Objective: See treatment diary below      Assessment: Tolerated treatment well. Patient continues to be appropriately challenged at current therapeutic volume and intensity. Patient would benefit from continued PT      Plan: Continue per plan of care.        POC expires Auth Expiration date PT/OT + Visit Limit?   2025   BOMN        Visit/Unit Tracking  AUTH Status:  Date 11/13 11/19 11/22 11/27 12/2 12/4   [employer] Used 1 2 3 4 5 6    Remaining                 Precautions:       Manuals      R Lumbar UPAs        R Hip MWM SE SE distraction  SE                     Neuro Re-Ed        Patient Ed        Adductor Lunge with Slider  3x8 B  3x8 8#     Bridge with adduction  3x8 march SL hip thrust SL hip Thrust      GHD        Adductor Half Kneel Lunge   2x10 20# KB 2x10 25# KB     Kickstand SL RDL with Landmine   Bar 45# 3x8 B 50# 3x8     Hip Hinge        SL Total Gym  2x15 2x15 3x12     Modified Side Plank  10x5\" holds Full side plank to fatigue 4x B Full side plank 4x B     Hip Flexor knee to chest raise at wall   3x8 otb B 3x8 otb B     Ther Ex        Warm Up        Pallof Prss with Circles 2x10 B 10# 2x10 B 10# with sidesteps 2x10 B 10# with sidesteps     ASLR Supine with Core Brace 3x8  3x8 3# 3x8 3#     X Walks 3 laps 3 laps 3 laps     Quadruped Hip Rock (Foot Crossed) 15x HEP                              Ther Activity                        Gait Training                        Modalities                                         "

## 2024-12-13 ENCOUNTER — OFFICE VISIT (OUTPATIENT)
Dept: PHYSICAL THERAPY | Facility: REHABILITATION | Age: 54
End: 2024-12-13
Payer: COMMERCIAL

## 2024-12-13 DIAGNOSIS — M54.50 ACUTE RIGHT-SIDED LOW BACK PAIN, UNSPECIFIED WHETHER SCIATICA PRESENT: Primary | ICD-10-CM

## 2024-12-13 PROCEDURE — 97530 THERAPEUTIC ACTIVITIES: CPT | Performed by: PHYSICAL THERAPIST

## 2024-12-13 PROCEDURE — 97112 NEUROMUSCULAR REEDUCATION: CPT | Performed by: PHYSICAL THERAPIST

## 2024-12-13 PROCEDURE — 97110 THERAPEUTIC EXERCISES: CPT | Performed by: PHYSICAL THERAPIST

## 2024-12-13 NOTE — PROGRESS NOTES
"Daily Note     Today's date: 2024  Patient name: Mikie Garcia  : 1970  MRN: [id number]  Referring provider: Jim Garcia, PT  Dx:   Encounter Diagnosis     ICD-10-CM    1. Acute right-sided low back pain, unspecified whether sciatica present  M54.50                      Subjective: I am feeling like I am continuing to make progress. I was pretty sore after last session, but felt that I recovered well.       Objective: See treatment diary below      Assessment: Tolerated treatment well. Patient continues to be appropriately challenged at current therapeutic volume and intensity. Patient would benefit from continued PT      Plan: Continue per plan of care.        POC expires Auth Expiration date PT/OT + Visit Limit?   2025   BOMN        Visit/Unit Tracking  AUTH Status:  Date 11/13 11/19 11/22 11/27 12/2 12/4   [employer] Used 1 2 3 4 5 6    Remaining                 Precautions:       Manuals     R Lumbar UPAs        R Hip MWM SE SE distraction  SE                     Neuro Re-Ed        Sidelying Adduction     3x8 2#    Adductor Lunge with Slider  3x8 B  3x8 8# 3x8 10#    Bridge with adduction  3x8 march SL hip thrust SL hip Thrust  SL hip thrust 10#    GHD        Adductor Half Kneel Lunge   2x10 20# KB 2x10 25# KB 2x10 25#    Kickstand SL RDL with Landmine   Bar 45# 3x8 B 50# 3x8 50# 3x8    Hip Hinge        SL Total Gym  2x15 2x15 3x12 DC    Modified Side Plank  10x5\" holds Full side plank to fatigue 4x B Full side plank 4x B 4x B    Hip Flexor knee to chest raise at wall   3x8 otb B 3x8 otb B 3x8 gtb    Ther Ex        Warm Up        Pallof Prss with Circles 2x10 B 10# 2x10 B 10# with sidesteps 2x10 B 10# with sidesteps 2x10 B 12#    ASLR Supine with Core Brace 3x8  3x8 3# 3x8 3# 3x8 4#    X Walks 3 laps 3 laps 3 laps 3 laps    Quadruped Hip Rock (Foot Crossed) 15x HEP                              Ther Activity        Patient Education     HEP update/return to " piero 8'            Gait Training                        Modalities

## 2024-12-16 ENCOUNTER — EVALUATION (OUTPATIENT)
Dept: PHYSICAL THERAPY | Facility: REHABILITATION | Age: 54
End: 2024-12-16
Payer: COMMERCIAL

## 2024-12-16 DIAGNOSIS — M54.50 ACUTE RIGHT-SIDED LOW BACK PAIN, UNSPECIFIED WHETHER SCIATICA PRESENT: Primary | ICD-10-CM

## 2024-12-16 PROCEDURE — 97110 THERAPEUTIC EXERCISES: CPT | Performed by: PHYSICAL THERAPIST

## 2024-12-16 PROCEDURE — 97530 THERAPEUTIC ACTIVITIES: CPT | Performed by: PHYSICAL THERAPIST

## 2024-12-16 PROCEDURE — 97112 NEUROMUSCULAR REEDUCATION: CPT | Performed by: PHYSICAL THERAPIST

## 2024-12-16 PROCEDURE — 97164 PT RE-EVAL EST PLAN CARE: CPT | Performed by: PHYSICAL THERAPIST

## 2024-12-16 NOTE — PROGRESS NOTES
PT Re-Evaluation     Today's date: 2024  Patient name: Mikie Garcia  : 1970  MRN: [id number]  Referring provider: Nirav Villareal DO  Dx:   Encounter Diagnosis     ICD-10-CM    1. Acute right-sided low back pain, unspecified whether sciatica present  M54.50                      Subjective: Overall I feel that my back has gotten much better since I have started physical therapy. I do still get pain that is in my right leg and groin area, especially if I try to do a lot physically. I am not back to golfing yet which is my main goal.     Current Pain Ratin/10        Objective: See treatment diary below    Myotomes  Strength WNL bilaterally.    Dermatomes  Sensation intact bilaterally    Reflexes  R Patellar Reflex: (2+)  L Patellar Reflex: (2+)  R Achilles Reflex: (2+)  L Achilles Reflex: (2+)    Neurodynamic Testing  Slump Test: -  SLR: -   Femoral Nerve Traction Test: +R    Lumbar Active Range of Motion  Movement Loss Symptoms Marcio Mod Min Nil Symptoms   Flexion   x  Mild pain with flexion and left rotation   Extension    x    R SG    x    L SG    x    Other          Manual Muscle Testing:   Hip Flexion: R 4/5  L 5/5   Hip Extension: R 4-/5   L 4+/5   Hip Abduction: R 4/5   L 5/5   Hip Adduction: R 4-/5   L 5/5     Squat: Mild left weight shift and mild groin pain         Assessment: Mikie Garcia has made great progress since initiating physical therapy through the comprehensive spine program. He initially presented with low back pain radiating into his right groin, thigh, and buttock. He does still complain of similar pain, but to a lesser intensity. He has been able to progress in regards to intensity of exercise as well as difficulty of exercises. His range of motion of his low back has improved greatly, with some minimal limitations into flexion. Radicular symptoms have continued to lessen. He has not been able to return to his main goal of golfing yet. He does still have weakness of his hip  adductors, abductors, and extensors. He lacks movement coordination especially when attempting to hinge from his hips and use his posterior chain. Mikie would continue to benefit from skilled PT at this time to address his outstanding deficits, improve his quality of life, and restore his PLOF.     Goals  Impairment Based Goals:  1. Decreased pain by 50% in 4 weeks. (Progress)  2. Improve ROM to WFL by discharge. (Not met)  3. Improve strength by 1/2 measure in 6 weeks. (Progress)  4. Improve FOTO greater than predicted outcome score by discharge. (Progress)      Functional Based Goals: To be met upon discharge  1. Patient will be able to return to playing golf. (Not met)  2. Patient will be fully independent with HEP by discharge (not met)  3. Patient will be able to manage symptoms independently. (Not met)        Plan: Continue per plan of care.            POC expires Auth Expiration date PT/OT + Visit Limit?   1/22/2025   BOMN        Visit/Unit Tracking  AUTH Status:  Date 11/13 11/19 11/22 11/27 12/2 12/4   [employer] Used 1 2 3 4 5 6    Remaining                 Precautions:       Manuals 12/16    R Lumbar UPAs     R Hip MWM               Neuro Re-Ed     Sidelying Adduction  3x8 2#    Adductor Lunge with Slider 3x8 10#    Bridge with adduction  SL hip thrust 10#    GHD     Adductor Half Kneel Lunge  2x10 25#    Kickstand SL RDL with Landmine  50# 3x8    Hip Hinge     SL Total Gym  DC    Modified Side Plank  4x B    Hip Flexor knee to chest raise at wall  3x8 gtb    Ther Ex     Warm Up     Pallof Prss with Circles 2x10 B 12#    ASLR Supine with Core Brace 3x8 4#    X Walks 3 laps    Quadruped Hip Rock (Foot Crossed)                    Ther Activity     Patient Education  HEP update/return to golf 8'         Gait Training               Modalities

## 2024-12-16 NOTE — LETTER
2024    Nirav Villareal DO  [address] [address]    Patient: Mikie Garcia   YOB: 1970   Date of Visit: 2024     Encounter Diagnosis     ICD-10-CM    1. Acute right-sided low back pain, unspecified whether sciatica present  M54.50           Dear Dr. Villareal:    Thank you for your recent referral of Mikie Garcia. Please review the attached evaluation summary from Mikie's recent visit.     Please verify that you agree with the plan of care by signing the attached order.     If you have any questions or concerns, please do not hesitate to call.     I sincerely appreciate the opportunity to share in the care of one of your patients and hope to have another opportunity to work with you in the near future.       Sincerely,    Brian Henderson, PT      Referring Provider:      I certify that I have read the below Plan of Care and certify the need for these services furnished under this plan of treatment while under my care.                    Nirav Villareal DO  [address] [address]  Via Fax: [phone]          PT Re-Evaluation     Today's date: 2024  Patient name: Mikie Garcia  : 1970  MRN: [id number]  Referring provider: Nirav Villareal DO  Dx:   Encounter Diagnosis     ICD-10-CM    1. Acute right-sided low back pain, unspecified whether sciatica present  M54.50                      Subjective: Overall I feel that my back has gotten much better since I have started physical therapy. I do still get pain that is in my right leg and groin area, especially if I try to do a lot physically. I am not back to golfing yet which is my main goal.     Current Pain Ratin/10        Objective: See treatment diary below    Myotomes  Strength WNL bilaterally.    Dermatomes  Sensation intact bilaterally    Reflexes  R Patellar Reflex: (2+)  L Patellar Reflex: (2+)  R Achilles Reflex: (2+)  L Achilles Reflex:  (2+)    Neurodynamic Testing  Slump Test: -  SLR: -   Femoral Nerve Traction Test: +R    Lumbar Active Range of Motion  Movement Loss Symptoms Marcio Mod Min Nil Symptoms   Flexion   x  Mild pain with flexion and left rotation   Extension    x    R SG    x    L SG    x    Other          Manual Muscle Testing:   Hip Flexion: R 4/5  L 5/5   Hip Extension: R 4-/5   L 4+/5   Hip Abduction: R 4/5   L 5/5   Hip Adduction: R 4-/5   L 5/5     Squat: Mild left weight shift and mild groin pain         Assessment: Mikie Garcia has made great progress since initiating physical therapy through the comprehensive spine program. He initially presented with low back pain radiating into his right groin, thigh, and buttock. He does still complain of similar pain, but to a lesser intensity. He has been able to progress in regards to intensity of exercise as well as difficulty of exercises. His range of motion of his low back has improved greatly, with some minimal limitations into flexion. Radicular symptoms have continued to lessen. He has not been able to return to his main goal of golfing yet. He does still have weakness of his hip adductors, abductors, and extensors. He lacks movement coordination especially when attempting to hinge from his hips and use his posterior chain. Mikie would continue to benefit from skilled PT at this time to address his outstanding deficits, improve his quality of life, and restore his PLOF.     Goals  Impairment Based Goals:  1. Decreased pain by 50% in 4 weeks. (Progress)  2. Improve ROM to WFL by discharge. (Not met)  3. Improve strength by 1/2 measure in 6 weeks. (Progress)  4. Improve FOTO greater than predicted outcome score by discharge. (Progress)      Functional Based Goals: To be met upon discharge  1. Patient will be able to return to playing golf. (Not met)  2. Patient will be fully independent with HEP by discharge (not met)  3. Patient will be able to manage symptoms independently. (Not  met)        Plan: Continue per plan of care.            POC expires Auth Expiration date PT/OT + Visit Limit?   1/22/2025   BOMN        Visit/Unit Tracking  AUTH Status:  Date 11/13 11/19 11/22 11/27 12/2 12/4   [employer] Used 1 2 3 4 5 6    Remaining                 Precautions:       Manuals 12/16    R Lumbar UPAs     R Hip MWM               Neuro Re-Ed     Sidelying Adduction  3x8 2#    Adductor Lunge with Slider 3x8 10#    Bridge with adduction  SL hip thrust 10#    GHD     Adductor Half Kneel Lunge  2x10 25#    Kickstand SL RDL with Landmine  50# 3x8    Hip Hinge     SL Total Gym  DC    Modified Side Plank  4x B    Hip Flexor knee to chest raise at wall  3x8 gtb    Ther Ex     Warm Up     Pallof Prss with Circles 2x10 B 12#    ASLR Supine with Core Brace 3x8 4#    X Walks 3 laps    Quadruped Hip Rock (Foot Crossed)                    Ther Activity     Patient Education  HEP update/return to golf 8'         Gait Training               Modalities

## 2024-12-18 ENCOUNTER — OFFICE VISIT (OUTPATIENT)
Dept: PHYSICAL THERAPY | Facility: REHABILITATION | Age: 54
End: 2024-12-18
Payer: COMMERCIAL

## 2024-12-18 DIAGNOSIS — M54.50 ACUTE RIGHT-SIDED LOW BACK PAIN, UNSPECIFIED WHETHER SCIATICA PRESENT: Primary | ICD-10-CM

## 2024-12-18 PROCEDURE — 97110 THERAPEUTIC EXERCISES: CPT | Performed by: PHYSICAL THERAPIST

## 2024-12-18 PROCEDURE — 97530 THERAPEUTIC ACTIVITIES: CPT | Performed by: PHYSICAL THERAPIST

## 2024-12-18 PROCEDURE — 97112 NEUROMUSCULAR REEDUCATION: CPT | Performed by: PHYSICAL THERAPIST

## 2024-12-18 NOTE — PROGRESS NOTES
Daily Note     Today's date: 2024  Patient name: Mikie Garcia  : 1970  MRN: [id number]  Referring provider: Nirav Villareal DO  Dx:   Encounter Diagnosis     ICD-10-CM    1. Acute right-sided low back pain, unspecified whether sciatica present  M54.50                      Subjective: Patient reports no change in status upon arrival.       Objective: See treatment diary below      Assessment: Tolerated treatment well. Patient continues to be appropriately challenged at current therapeutic volume and intensity. Patient would benefit from continued PT      Plan: Continue per plan of care.        POC expires Auth Expiration date PT/OT + Visit Limit?   2025   BOMN        Visit/Unit Tracking  AUTH Status:  Date    [employer] Used 1 2 3 4 5 6    Remaining                 Precautions:       Manuals    R Lumbar UPAs     R Hip MWM               Neuro Re-Ed     Sidelying Adduction  3x8 2# 3x10 2#   Adductor Lunge with Slider 3x8 10# 3x8 12#   Bridge with adduction  SL hip thrust 10# SL hip thrust 10#    GHD     Adductor Half Kneel Lunge  2x10 25# 2x10 25#   Kickstand SL RDL with Landmine  50# 3x8 50# 3x8   Hip Hinge     SL Total Gym  DC    Modified Side Plank  4x B 4x B   Hip Flexor knee to chest raise at wall  3x8 gtb 3x8 gtb   Ther Ex     Warm Up     Pallof Prss with Circles 2x10 B 12# 2x10 B 12#   ASLR Supine with Core Brace 3x8 4# 3x8 4#   X Walks 3 laps 3 laps   Quadruped Hip Rock (Foot Crossed)                    Ther Activity     Patient Education  HEP update/return to golf 8'    Return to Golf Swings  2x15   Front Rack Carrying  25# 4 laps        Gait Training               Modalities

## 2024-12-23 ENCOUNTER — OFFICE VISIT (OUTPATIENT)
Dept: PHYSICAL THERAPY | Facility: REHABILITATION | Age: 54
End: 2024-12-23
Payer: COMMERCIAL

## 2024-12-23 DIAGNOSIS — M54.50 ACUTE RIGHT-SIDED LOW BACK PAIN, UNSPECIFIED WHETHER SCIATICA PRESENT: Primary | ICD-10-CM

## 2024-12-23 PROCEDURE — 97110 THERAPEUTIC EXERCISES: CPT | Performed by: PHYSICAL THERAPIST

## 2024-12-23 PROCEDURE — 97530 THERAPEUTIC ACTIVITIES: CPT | Performed by: PHYSICAL THERAPIST

## 2024-12-23 PROCEDURE — 97112 NEUROMUSCULAR REEDUCATION: CPT | Performed by: PHYSICAL THERAPIST

## 2024-12-23 NOTE — PROGRESS NOTES
Daily Note     Today's date: 2024  Patient name: Mikie Garcia  : 1970  MRN: [id number]  Referring provider: Jim Garcia, PT  Dx:   Encounter Diagnosis     ICD-10-CM    1. Acute right-sided low back pain, unspecified whether sciatica present  M54.50                      Subjective: At this time, the pain in my back is pretty much gone. Only having some pain in the front of the right leg.       Objective: See treatment diary below      Assessment: Tolerated treatment well. Patient continues to be appropriately challenged at current therapeutic volume and intensity. Patient would benefit from continued PT      Plan: Continue per plan of care.        POC expires Auth Expiration date PT/OT + Visit Limit?   2025   BOMN        Visit/Unit Tracking  AUTH Status:  Date    [employer] Used 1 2 3 4 5 6    Remaining                 Precautions:       Manuals    R Lumbar UPAs      R Hip MWM                  Neuro Re-Ed      Sidelying Adduction  3x8 2# 3x10 2# 3x10 3#   Adductor Lunge with Slider 3x8 10# 3x8 12# 3x8 12#   Bridge with adduction  SL hip thrust 10# SL hip thrust 10#  SL hip thrust 12#   Tall Kneel Pallof   10# 2x10 B   Adductor Half Kneel Lunge  2x10 25# 2x10 25# 2x10 25#   Kickstand SL RDL with Landmine  50# 3x8 50# 3x8 50# 3x8   Hip Hinge      SL Total Gym  DC     Modified Side Plank  4x B 4x B 4x B   Hip Flexor knee to chest raise at wall  3x8 gtb 3x8 gtb 3x8 gtb   Ther Ex      Warm Up      Pallof Prss with Circles 2x10 B 12# 2x10 B 12# 2x10 B 12#   ASLR Supine with Core Brace 3x8 4# 3x8 4# 3x8 4#   X Walks 3 laps 3 laps 3 laps   Quadruped Hip Rock (Foot Crossed)                        Ther Activity      Patient Education  HEP update/return to golf 8'     Return to Golf Swings  2x15 Trunk rotations long bar 2x10 12# B   Front Rack Carrying  25# 4 laps 25# 4 laps         Gait Training                  Modalities

## 2024-12-26 ENCOUNTER — OFFICE VISIT (OUTPATIENT)
Dept: PHYSICAL THERAPY | Facility: REHABILITATION | Age: 54
End: 2024-12-26
Payer: COMMERCIAL

## 2024-12-26 DIAGNOSIS — M54.50 ACUTE RIGHT-SIDED LOW BACK PAIN, UNSPECIFIED WHETHER SCIATICA PRESENT: Primary | ICD-10-CM

## 2024-12-26 PROCEDURE — 97530 THERAPEUTIC ACTIVITIES: CPT | Performed by: PHYSICAL THERAPIST

## 2024-12-26 PROCEDURE — 97112 NEUROMUSCULAR REEDUCATION: CPT | Performed by: PHYSICAL THERAPIST

## 2024-12-26 PROCEDURE — 97110 THERAPEUTIC EXERCISES: CPT | Performed by: PHYSICAL THERAPIST

## 2024-12-26 NOTE — PROGRESS NOTES
Daily Note     Today's date: 2024  Patient name: Mikie Garcia  : 1970  MRN: [id number]  Referring provider: Jim Garcia, PT  Dx:   Encounter Diagnosis     ICD-10-CM    1. Acute right-sided low back pain, unspecified whether sciatica present  M54.50           Start Time: 1145  Stop Time: 1225  Total time in clinic (min): 40 minutes    Subjective: My leg has felt much better and looser, along with my back.       Objective: See treatment diary below      Assessment: Tolerated treatment well. Patient continues to be appropriately challenged at current therapeutic volume and intensity. Patient would benefit from continued PT      Plan: Continue per plan of care.        POC expires Auth Expiration date PT/OT + Visit Limit?   2025   BOMN        Visit/Unit Tracking  AUTH Status:  Date    [employer] Used 1 2 3 4 5 6    Remaining                 Precautions:       Manuals    R Lumbar UPAs       R Hip MWM                     Neuro Re-Ed       Sidelying Adduction  3x8 2# 3x10 2# 3x10 3# 3x10 4#   Adductor Lunge with Slider 3x8 10# 3x8 12# 3x8 12# 3x8 15#   Bridge with adduction  SL hip thrust 10# SL hip thrust 10#  SL hip thrust 12# SL hip thrust 15#   Tall Kneel Pallof   10# 2x10 B 12# 2x10 B   Adductor Half Kneel Lunge  2x10 25# 2x10 25# 2x10 25# 2x10 25#   Kickstand SL RDL with Landmine  50# 3x8 50# 3x8 50# 3x8 50# 3x8   Hip Hinge       SL Total Gym  DC      Modified Side Plank  4x B 4x B 4x B 4x B    Hip Flexor knee to chest raise at wall  3x8 gtb 3x8 gtb 3x8 gtb 3x8 gtb   Ther Ex       Warm Up       Pallof Prss with Circles 2x10 B 12# 2x10 B 12# 2x10 B 12# 2x10 B 12#   ASLR Supine with Core Brace 3x8 4# 3x8 4# 3x8 4# 3x8 4#   X Walks 3 laps 3 laps 3 laps 3 laps   Quadruped Hip Rock (Foot Crossed)                            Ther Activity       Patient Education  HEP update/return to golf 8'      Return to Golf Swings  2x15 Trunk  rotations long bar 2x10 12# B Trunk rotations long bar 2x10 12# B   Front Rack Carrying  25# 4 laps 25# 4 laps 25# 4 laps          Gait Training                     Modalities